# Patient Record
Sex: FEMALE | URBAN - METROPOLITAN AREA
[De-identification: names, ages, dates, MRNs, and addresses within clinical notes are randomized per-mention and may not be internally consistent; named-entity substitution may affect disease eponyms.]

---

## 2022-08-12 ENCOUNTER — AMB VIDEO VISIT (OUTPATIENT)
Dept: OTHER | Facility: HOSPITAL | Age: 47
End: 2022-08-12

## 2022-08-12 DIAGNOSIS — Z20.822 ENCOUNTER FOR SCREENING LABORATORY TESTING FOR COVID-19 VIRUS IN ASYMPTOMATIC PATIENT: Primary | ICD-10-CM

## 2022-08-12 PROCEDURE — ECARE PR SL URGENT CARE VIRTUAL VISIT: Performed by: PHYSICIAN ASSISTANT

## 2022-08-12 NOTE — LETTER
8/12/2022  Serge Schwartz  1975    Serge Schwartz completed an at home Covid-19 antigen test on August 12, 2022 that was directly proctored by me utilizing video telehealth services  The test was negative for Covid-19      [facility] VIDEO VISIT [address] [id number]

## 2022-08-13 ENCOUNTER — AMB VIDEO VISIT (OUTPATIENT)
Dept: OTHER | Facility: HOSPITAL | Age: 47
End: 2022-08-13

## 2022-08-13 NOTE — PROGRESS NOTES
Video Visit - Jase Salcido 55 y o  female MRN: [id number]    REQUIRED DOCUMENTATION:         1  This service was provided via [employer]  2  Provider located at [address] [place] [id number]  3  [facility] provider: Carlos Cruz PA-C   4  Identify all parties in room with patient during [facility] visit:  significant other-permission granted and child(nilson)- permission granted  5  After connecting through [facility], patient was identified by name and date of birth  Patient was then informed that this was a Telemedicine visit and that the exam was being conducted confidentially over secure lines  My office door was closed  No one else was in the room  Patient acknowledged consent and understanding of privacy and security of the Telemedicine visit  I informed the patient that I have reviewed their record in Epic and presented the opportunity for them to ask any questions regarding the visit today  The patient agreed to participate  HPI  55 y o  female presents via telehealth for proctored COVID testing  Patient is not currently having symptoms  I supervised patient while patient performed at-home COVID test with the following steps: swabbing each nostril rotating 5 times, inserting swab into reagent vial and twisting for 30 seconds, squeezing the sides as swab is pulled out, then squeezing 3 drops into collection area  The test was visible and not tampered with for 15 minutes  The test was interpreted as Negative by myself  Physical Exam  Constitutional:       General: She is not in acute distress  Appearance: Normal appearance  She is not toxic-appearing  HENT:      Head: Normocephalic and atraumatic  Nose: No rhinorrhea  Mouth/Throat:      Mouth: Mucous membranes are moist    Eyes:      Conjunctiva/sclera: Conjunctivae normal    Pulmonary:      Effort: Pulmonary effort is normal  No respiratory distress  Breath sounds:  No wheezing (no gross audible wheeze through computer)  Musculoskeletal:      Cervical back: Normal range of motion  Skin:     Findings: No rash (on face or neck)  Neurological:      Mental Status: She is alert  Cranial Nerves: No dysarthria or facial asymmetry  Psychiatric:         Mood and Affect: Mood normal          Behavior: Behavior normal           Diagnoses and all orders for this visit:    Encounter for screening laboratory testing for COVID-19 virus in asymptomatic patient      Patient Instructions   Have a great cruise!

## 2022-08-13 NOTE — CARE ANYWHERE EVISITS
Visit Summary for Glen Rice - Gender: Female - Date of Birth: [id number]  Date: [id number] - Duration: 3 minutes  Patient: Glen Rice  Provider: Jonathan Martinez PA-C    Patient Contact Information  Address  [address] [facility]; [address] [place] [address]      Visit Topics    Triage Questions   What is your current physical address in the event of a medical emergency? Answer []  Are you allergic to any medications? Answer []  Are you now or could you be pregnant? Answer []  Do you have any immune system compromise or chronic lung   disease? Answer []  Do you have any vulnerable family members in the home (infant, pregnant, cancer, elderly)? Answer []     Conversation Transcripts  [0A][0A] [Notification] You are connected with Jonathan Martinez PA-C, Urgent Care Specialist [0A][Notification] Quiana Tovar is located in [place]  [0A][Notification] Quiana Tovar has shared health history  Yakelin Rascon  [0A]    Diagnosis  Contact with and (suspected) exposure to COVID-19    Procedures  Value: [id number] Code: CPT-4 UNLISTED E&M SERVICE    Medications Prescribed    No prescriptions ordered    Provider Notes  [0A][0A] technical difficulties creating [employer] account   [0A]    Electronically signed by: Jory Hines(NPI [id number])